# Patient Record
Sex: MALE | Race: WHITE | NOT HISPANIC OR LATINO | Employment: OTHER | URBAN - METROPOLITAN AREA
[De-identification: names, ages, dates, MRNs, and addresses within clinical notes are randomized per-mention and may not be internally consistent; named-entity substitution may affect disease eponyms.]

---

## 2024-04-08 ENCOUNTER — OFFICE VISIT (OUTPATIENT)
Dept: PAIN MEDICINE | Facility: CLINIC | Age: 79
End: 2024-04-08
Payer: MEDICARE

## 2024-04-08 VITALS
HEART RATE: 89 BPM | BODY MASS INDEX: 24.08 KG/M2 | HEIGHT: 67 IN | DIASTOLIC BLOOD PRESSURE: 83 MMHG | SYSTOLIC BLOOD PRESSURE: 151 MMHG | WEIGHT: 153.44 LBS

## 2024-04-08 DIAGNOSIS — E11.40 PAINFUL DIABETIC NEUROPATHY: Primary | ICD-10-CM

## 2024-04-08 DIAGNOSIS — E11.42 DIABETIC PERIPHERAL NEUROPATHY: Primary | ICD-10-CM

## 2024-04-08 PROCEDURE — 99204 OFFICE O/P NEW MOD 45 MIN: CPT | Mod: S$PBB,,, | Performed by: ANESTHESIOLOGY

## 2024-04-08 PROCEDURE — 99203 OFFICE O/P NEW LOW 30 MIN: CPT | Mod: PBBFAC,PO | Performed by: ANESTHESIOLOGY

## 2024-04-08 PROCEDURE — 99999 PR PBB SHADOW E&M-NEW PATIENT-LVL III: CPT | Mod: PBBFAC,,, | Performed by: ANESTHESIOLOGY

## 2024-04-08 RX ORDER — QUETIAPINE FUMARATE 50 MG/1
50 TABLET, FILM COATED ORAL NIGHTLY
COMMUNITY

## 2024-04-08 RX ORDER — OMEPRAZOLE 40 MG/1
40 CAPSULE, DELAYED RELEASE ORAL
COMMUNITY
Start: 2024-02-29

## 2024-04-08 RX ORDER — HUMAN INSULIN 100 [IU]/ML
INJECTION, SUSPENSION SUBCUTANEOUS
COMMUNITY
Start: 2024-02-18

## 2024-04-08 RX ORDER — LAMOTRIGINE 150 MG/1
200 TABLET ORAL
COMMUNITY

## 2024-04-08 RX ORDER — SOTALOL HYDROCHLORIDE 80 MG/1
80 TABLET ORAL
COMMUNITY

## 2024-04-08 RX ORDER — INSULIN HUMAN 100 [IU]/ML
INJECTION, SUSPENSION SUBCUTANEOUS
COMMUNITY

## 2024-04-08 RX ORDER — LEVOTHYROXINE SODIUM 75 UG/1
75 TABLET ORAL
COMMUNITY

## 2024-04-08 RX ORDER — METOPROLOL SUCCINATE 200 MG/1
TABLET, EXTENDED RELEASE ORAL
COMMUNITY

## 2024-04-08 RX ORDER — GABAPENTIN 400 MG/1
400 CAPSULE ORAL 3 TIMES DAILY
COMMUNITY
Start: 2024-03-07

## 2024-04-08 RX ORDER — MECLIZINE HYDROCHLORIDE 50 MG/1
TABLET ORAL 3 TIMES DAILY PRN
COMMUNITY

## 2024-04-08 RX ORDER — APIXABAN 5 MG/1
5 TABLET, FILM COATED ORAL 2 TIMES DAILY
COMMUNITY

## 2024-04-08 NOTE — PROGRESS NOTES
[facility] New Patient Evaluation      Referred by: Carmelo Anthony    PCP:     CC:   Chief Complaint   Patient presents with    Leg Pain    Foot Pain     bilateral          4/8/2024    11:04 AM   Last 3 PDI Scores   Pain Disability Index (PDI) 29         HPI:   Darryl Boggs is a 79 y.o. male patient who has no past medical history on file. He presents with pain in his feet extending up towards his knees.  This has been gradually worsening over the past 2 years.  Today he reports his pain is 7/10, constant, burning, numb, tingling primarily in his feet but extending up his ankles and shins.  Pain is constant and is partially relieved with gabapentin.  He takes 400 mg 3 times a day.  This provides him with about 50% relief.      Pain Intervention History:      Past Spine Surgical History:      Past and current medications:  Antineuropathics: gabapentin   NSAIDs:  Physical therapy:  Antidepressants:  Muscle relaxers:  Opioids:  Antiplatelets/Anticoagulants: eliquis     History:    Current Outpatient Medications:     ELIQUIS 5 mg Tab, Take 5 mg by mouth 2 (two) times daily., Disp: , Rfl:     gabapentin (NEURONTIN) 400 MG capsule, Take 400 mg by mouth 3 (three) times daily., Disp: , Rfl:     insulin NPH (HUMULIN N NPH U-100 INSULIN) 100 unit/mL injection, Inject into the skin., Disp: , Rfl:     lamoTRIgine (LAMICTAL) 150 MG Tab, Take 200 mg by mouth., Disp: , Rfl:     meclizine (ANTIVERT) 50 MG tablet, 3 (three) times daily as needed., Disp: , Rfl:     metoprolol succinate (TOPROL-XL) 200 MG 24 hr tablet, daily   Only takes 1/2 tab, Disp: , Rfl:     NOVOLIN N NPH U-100 INSULIN 100 unit/mL injection, Inject into the skin., Disp: , Rfl:     omeprazole (PRILOSEC) 40 MG capsule, Take 40 mg by mouth., Disp: , Rfl:     QUEtiapine (SEROQUEL) 50 MG tablet, Take 50 mg by mouth every evening., Disp: , Rfl:     sotaloL (BETAPACE) 80 MG tablet, Take 80 mg by mouth., Disp: , Rfl:     SYNTHROID 75 mcg tablet, Take 75  "mcg by mouth., Disp: , Rfl:     History reviewed. No pertinent past medical history.    History reviewed. No pertinent surgical history.    History reviewed. No pertinent family history.    Social History     Socioeconomic History    Marital status:        Review of patient's allergies indicates:   Allergen Reactions    Bacitracin-polymyxin b Swelling     Swelling and bleeding    Lidocaine Other (See Comments)     Cardiac arrest. Only reacts to large doses. Reation was in 1982    Iodinated contrast media Other (See Comments) and Rash     Sweat and redness. Reaction in 1986. Has had a CT last year (2023) without problems    Oxycodone-acetaminophen Nausea And Vomiting    Tyloxapol Other (See Comments) and Rash       Review of Systems:  12 point review of systems is negative.    Physical Exam:  Vitals:    04/08/24 1104   BP: (!) 151/83   Pulse: 89   Weight: 69.6 kg (153 lb 7 oz)   Height: 5' 7" (1.702 m)   PainSc:   7   PainLoc: Leg     Body mass index is 24.03 kg/m².    Gen: NAD  Psych: mood appropriate for given condition  HEENT: eyes anicteric   CV: RRR  HEENT: anicteric   Respiratory: non-labored, no signs of respiratory distress  Abd: non-distended  Skin: warm, dry and intact.  Gait: No antalgic gait.     Sensory:  Intact and symmetrical to light touch in L1-S1 dermatomes bilaterally, except decreased bilaterally in a nondermatomal distribution below his knees    Motor:     Right Left   L2/3 Iliacus Hip flexion  5  5   L3/4 Qudratus Femoris Knee Extension  5  5   L4/5 Tib Anterior Ankle Dorsiflexion   5  5   L5/S1 Extensor Hallicus Longus Great toe extension  5  5   S1/S2 Gastroc/Soleus Plantar Flexion  5  5      Right Left                  Patellar DTR 0 0   Achilles DTR 0 0                      Labs:  No results found for: "LABA1C", "HGBA1C"    No results found for: "WBC", "HGB", "HCT", "MCV", "PLT"        Imaging:  none    Assessment:   Problem List Items Addressed This Visit    None  Visit Diagnoses  "      Painful diabetic neuropathy    -  Primary    Relevant Medications    insulin NPH (HUMULIN N NPH U-100 INSULIN) 100 unit/mL injection    NOVOLIN N NPH U-100 INSULIN 100 unit/mL injection              Darryl Boggs is a 79 y.o. male patient who has no past medical history on file. He presents with in his feet extending up towards his knees.  This has been gradually worsening over the past 2 years.  Today he reports his pain is 7/10, constant, burning, numb, tingling primarily in his feet but extending up his ankles and shins.  Pain is constant and is partially relieved with gabapentin.  He takes 400 mg 3 times a day.  This provides him with about 50% relief.    - on exam he has full strength in his lower extremities.  He has decreased sensation in a nondermatomal distribution in his bilateral feet, ankles, shins  - past medical history includes history of hepatitis C and diabetes.  During his hepatitis workup he was found to have diabetes and it was not well controlled.  - reviewing outside labs as recently as February 2024 his glucose was as high as 400.  I do not see any recent hemoglobin A1c levels.  The patient reports he is currently under treatment for his diabetes with insulin  - I think the pain in his lower extremities secondary to painful diabetic neuropathy.  His pain is limiting his mobility and interfering with the quality of his life.  His pain can keep him awake at night.  He is getting partial but incomplete relief with gabapentin.  We are going to bring him in for qutenza application to his bilateral feet.  - in the future if he fails to find significant relief with this he may be a candidate for a spinal cord stimulator trial with [employer], however of note he does have a cardiac pacemaker      : Not applicable    Addi Marshall M.D.  Interventional Pain Medicine / Anesthesiology    This note was completed with dictation software and grammatical errors may exist.

## 2024-04-24 ENCOUNTER — PROCEDURE VISIT (OUTPATIENT)
Dept: PAIN MEDICINE | Facility: CLINIC | Age: 79
End: 2024-04-24
Payer: MEDICARE

## 2024-04-24 VITALS
BODY MASS INDEX: 24.03 KG/M2 | WEIGHT: 153.13 LBS | HEIGHT: 67 IN | DIASTOLIC BLOOD PRESSURE: 81 MMHG | HEART RATE: 70 BPM | SYSTOLIC BLOOD PRESSURE: 181 MMHG

## 2024-04-24 DIAGNOSIS — E11.40 PAINFUL DIABETIC NEUROPATHY: Primary | ICD-10-CM

## 2024-04-24 PROCEDURE — 99999PBSHW PR PBB SHADOW TECHNICAL ONLY FILED TO HB: Mod: JZ,PBBFAC,,

## 2024-04-24 PROCEDURE — 99499 UNLISTED E&M SERVICE: CPT | Mod: S$PBB,,,

## 2024-04-24 PROCEDURE — 64999 UNLISTED PX NERVOUS SYSTEM: CPT | Mod: PBBFAC,PO

## 2024-04-24 PROCEDURE — 64999 UNLISTED PX NERVOUS SYSTEM: CPT | Mod: S$PBB,,,

## 2024-04-24 RX ADMIN — CAPSAICIN 2 PATCH: KIT at 03:04

## 2024-04-24 NOTE — PROCEDURES
This is a pain clinic procedure note.    Procedure: Qutenza Patch Application   Procedure Date: 04/24/2024  Location applied:  Dorsum and plantar aspect of bilateral feet  Subjective:  Preoperative Diagnosis:  Diabetic peripheral neuropathy  Post Procedure Diagnosis:  Diabetic peripheral neuropathy  Complications: None  Anesthetic:  None    Procedure details: Hands washed, dried and gloves donned prior to patient care. Examined the pt's feet for any signs of open wounds or broken skin. Skin intact with no open areas noted. Patches applied to plantar and dorsal areas of both clean, dry feet, wrapped with cast padding to hold in place. Examined skin 10 min after application, no sign of redness or irritation noted, pt denies any pain.  Patches applied at 3:15 p.m.     Hands washed, dried and gloves donned prior to removal of patches. Patches removed after 30 min, disposed of properly in biohazard receptacle. Feet cleansed with Qutenza cleanser provided, patted dry. Pt tolerated well.  Patches removed at 3:45 p.m.    Dispo: no complications, pt tolerated the procedure well.    Plan: RTC in 6 weeks